# Patient Record
Sex: MALE | URBAN - METROPOLITAN AREA
[De-identification: names, ages, dates, MRNs, and addresses within clinical notes are randomized per-mention and may not be internally consistent; named-entity substitution may affect disease eponyms.]

---

## 2022-01-01 ENCOUNTER — OFFICE VISIT (OUTPATIENT)
Dept: URGENT CARE | Age: 87
End: 2022-01-01
Payer: MEDICARE

## 2022-01-01 VITALS
TEMPERATURE: 100 F | WEIGHT: 183 LBS | HEIGHT: 65 IN | SYSTOLIC BLOOD PRESSURE: 142 MMHG | RESPIRATION RATE: 20 BRPM | BODY MASS INDEX: 30.49 KG/M2 | DIASTOLIC BLOOD PRESSURE: 80 MMHG | OXYGEN SATURATION: 100 % | HEART RATE: 80 BPM

## 2022-01-01 DIAGNOSIS — R50.81 FEVER IN OTHER DISEASES: Primary | ICD-10-CM

## 2022-01-01 DIAGNOSIS — R53.83 OTHER FATIGUE: ICD-10-CM

## 2022-01-01 DIAGNOSIS — U07.1 COVID-19: ICD-10-CM

## 2022-01-01 PROBLEM — R50.9 FEVER: Status: ACTIVE | Noted: 2022-01-01

## 2022-01-01 LAB
Lab: 2682
PERFORMING INSTRUMENT: ABNORMAL
QC PASS/FAIL: ABNORMAL
SARS-COV-2, POC: DETECTED

## 2022-01-01 PROCEDURE — 99202 OFFICE O/P NEW SF 15 MIN: CPT

## 2022-01-01 PROCEDURE — 87426 SARSCOV CORONAVIRUS AG IA: CPT

## 2022-01-01 RX ORDER — METHYLPREDNISOLONE 4 MG/1
TABLET ORAL
Qty: 1 KIT | Refills: 0 | Status: SHIPPED | OUTPATIENT
Start: 2022-01-01

## 2022-01-01 ASSESSMENT — ENCOUNTER SYMPTOMS
CHEST TIGHTNESS: 0
NAUSEA: 0
SINUS PAIN: 1
VOMITING: 0
SINUS PRESSURE: 1
COUGH: 0
SHORTNESS OF BREATH: 0
SORE THROAT: 0
DIARRHEA: 0

## 2022-01-01 NOTE — PROGRESS NOTES
Macario Medina (: 1935) is a 82 y.o. male here for evaluation of the following chief complaint(s):  Covid Testing      SUBJECTIVE:  HPI   Pt presents today with c/o ongoing fatigue, myalgias and sinus congestion/pressure for 1 week. Pt denies any known exposure to covid, however would like to be tested today. Review of Systems   Constitutional: Positive for activity change, chills, fatigue and fever. HENT: Positive for congestion, sinus pressure and sinus pain. Negative for ear pain and sore throat. Respiratory: Negative for cough, chest tightness and shortness of breath. Cardiovascular: Negative for chest pain and palpitations. Gastrointestinal: Negative for diarrhea, nausea and vomiting. Musculoskeletal: Negative for arthralgias and myalgias. Neurological: Negative for dizziness and facial asymmetry. OBJECTIVE:  BP (!) 142/80   Pulse 80   Temp 100 °F (37.8 °C)   Resp 20   Ht 5' 5\" (1.651 m)   Wt 183 lb (83 kg)   SpO2 100%   BMI 30.45 kg/m²    Physical Exam  Vitals reviewed. Constitutional:       Appearance: He is normal weight. He is ill-appearing. HENT:      Head: Normocephalic and atraumatic. Right Ear: Tympanic membrane normal.      Left Ear: Tympanic membrane normal.      Nose: Congestion and rhinorrhea present. Mouth/Throat:      Mouth: Mucous membranes are moist.      Pharynx: Oropharynx is clear. Posterior oropharyngeal erythema present. Eyes:      Extraocular Movements: Extraocular movements intact. Conjunctiva/sclera: Conjunctivae normal.      Pupils: Pupils are equal, round, and reactive to light. Cardiovascular:      Rate and Rhythm: Normal rate and regular rhythm. Pulses: Normal pulses. Heart sounds: Normal heart sounds. Pulmonary:      Effort: Pulmonary effort is normal.      Breath sounds: Normal breath sounds. Abdominal:      General: Abdomen is flat.  Bowel sounds are normal.   Musculoskeletal:         General: Normal range of motion. Cervical back: Normal range of motion. Skin:     General: Skin is warm and dry. Neurological:      General: No focal deficit present. Mental Status: He is alert and oriented to person, place, and time. Mental status is at baseline. Psychiatric:         Mood and Affect: Mood normal.         Behavior: Behavior normal.         Thought Content: Thought content normal.         Judgment: Judgment normal.         ASSESSMENT:  1. Fever in other diseases  -     POCT COVID-19, Antigen  2. COVID-19  3. Other fatigue      PLAN:    COVID swab collected in office today---positive  Take medications as prescribed  Discussed side effects of medications  [facility] and or [facility] for further management  Obtain rest.  Maintain hydration. Wash hands often with soap and water. Avoid smoke and other irritants. Wear face covering in public and follow social distancing recommendations. Discussed precautions and indications for returning to clinic. Discussed precautions and indications for seeking ED care. Recommend self quarantine for 5 days from symptom onset or positive test result and fever free for 24 hours    Return if symptoms worsen or fail to improve.      Electronically signed by WILNER Esquivel CNP on 1/1/2022 at 1:33 PM.

## 2022-01-01 NOTE — LETTER
NOTIFICATION RETURN TO WORK / SCHOOL    1/1/2022    Mr. Brian Owen  [phone]      To Whom It May Concern:    Brian Owen was tested for COVID-19 on 1/1/22, and the result was positive. I recommend:quarantine for 5 days and then if asymptomatic may return to work and mask for 5 days per [facility] guidelines. If there are questions or concerns, please have the patient contact our office.         Sincerely,      Precious Griggs, WILNER - CNP

## 2022-01-01 NOTE — PATIENT INSTRUCTIONS
COVID swab collected in office today---positive  Patient declined prescribed medications. OTC medications to treat symptoms. Obtain rest.  Maintain hydration. Wash hands often with soap and water. Avoid smoke and other irritants. Wear face covering in public and follow social distancing recommendations. Discussed precautions and indications for returning to clinic. Discussed precautions and indications for seeking ED care. Recommend self quarantine for 5 days from symptom onset or positive test result and fever free for 24 hours    Patient Education        10 Things to Do When You Have COVID-19      Stay home. Don't go to school, work, or public areas. And don't use public transportation, ride-shares, or taxis unless you have no choice. Leave your home only if you need to get medical care. But call the doctor's office first so they know you're coming. And wear a mask. Ask before leaving isolation. Follow your doctor's advice about when it is safe for you to leave isolation. Wear a mask when you are around other people. It can help stop the spread of the virus. Limit contact with people in your home. If possible, stay in a separate bedroom and use a separate bathroom. Avoid contact with pets and other animals. If possible, have a friend or family member care for them while you're sick. Cover your mouth and nose with a tissue when you cough or sneeze. Then throw the tissue in the trash right away. Wash your hands often, especially after you cough or sneeze. Use soap and water, and scrub for at least 20 seconds. If soap and water aren't available, use an alcohol-based hand . Don't share personal household items. These include bedding, towels, cups and glasses, and eating utensils. Clean and disinfect your home every day. Use household  or disinfectant wipes or sprays.      If needed, take acetaminophen (Tylenol) or ibuprofen (Advil, Motrin) to relieve fever and body aches. Read and follow all instructions on the label. Current as of: March 26, 2021               Content Version: 13.1  © 3466-6352 [employer]. Care instructions adapted under license by [facility] ([facility]). If you have questions about a medical condition or this instruction, always ask your healthcare professional. [place] [address] any warranty or liability for your use of this information.

## 2024-02-27 ENCOUNTER — TELEPHONE (OUTPATIENT)
Age: 89
End: 2024-02-27

## 2024-02-27 NOTE — TELEPHONE ENCOUNTER
Attempted to call patient, home phone was disconnected. His mobile phone went to [employer]. Advised him to call back in .  Called patient's son Martin (on HIPAA form) to discuss patient's ortho appt on 2/29. Advised him due to patient having [facility], he will be required to have a referral because of this. Martin states they have a [facility] into the [facility]. I advised him we should cancel the appt until we get the referral because we will be able to reschedule him quickly.  Martin verbalized understanding. He stated he will call his dad (patient) right now and let him know of all of this. He has no further questions at  this and states he will call as soon as they have the referral.

## 2024-03-01 ENCOUNTER — TELEPHONE (OUTPATIENT)
Dept: ORTHOPEDIC SURGERY | Age: 89
End: 2024-03-01

## 2024-03-01 NOTE — TELEPHONE ENCOUNTER
Other I RECEIVED A CALL FROM THE [facility] REGARDING THE PATIENT. THEY WANTED TO COMMUNICATE THAT THEY WILL NOT BE ISSUING A REFERRAL NOR WILL THEY  COVER A KNEE SURGERY WITH DR WALL , HOWEVER IF THE PATIENT CHOOSES TO CONTINUE TREATMENT WITH DR WALL THEN HE WILL INCUR THE CHARGES OR USE PERSONAL INSURANCE. HOWEVER IF THE PATIENT WISHES TO AVOID THESE FEES HE CAN HAVE THE SURGERY DONE THRU [facility] WHERE THEY WILL COVER IT, THEY ARE NO LONGER REFERRING PATIENTS OUT FOR KNEE SURGERIES. PATIENT DID NOT UNDERSTAND THIS COMMUNICATION FROM THE [facility] SO THEY ARE ASKING IF WE CAN TRY TO HELP TO COMMUNICATE TO PATIENT

## 2024-03-01 NOTE — TELEPHONE ENCOUNTER
I spoke with patient and he does understand what is going on now.  Pt understand and verbalized. He will follow with [facility]